# Patient Record
(demographics unavailable — no encounter records)

---

## 2025-03-07 NOTE — HEALTH RISK ASSESSMENT
[Little interest or pleasure doing things] : 1) Little interest or pleasure doing things [Feeling down, depressed, or hopeless] : 2) Feeling down, depressed, or hopeless [0] : 2) Feeling down, depressed, or hopeless: Not at all (0) [PHQ-9 Negative - No further assessment needed] : PHQ-9 Negative - No further assessment needed [Never] : Never

## 2025-03-17 NOTE — ASSESSMENT
[FreeTextEntry1] : 37M with PMH of obesity, HTN, HLD, pre-DM, GERD, presenting for GERD related symptoms with the complaint of occasional dysphagia.   Plan:  - will start PPI trial with omeprazole 40 mg PO daily x 2 months, followed by 1 month off - f/u H. pylori breath test and CBC from today  - if no improvement, will plan for EGD given above symptoms  - will obtain records from 2010 EGD with [facility]  - referral provided for GI dietician  - discussed lifestyle modifications for GERD, many of which patient has incorporated already  - RTC in 3 months once off of PPI to reassess symptoms and need for EGD

## 2025-03-17 NOTE — HISTORY OF PRESENT ILLNESS
[FreeTextEntry1] : 37M with PMH of obesity, HTN, HLD, pre-DM, GERD, presenting for GERD related symptoms.   Patient describes that symptoms began in [id number], so saw a GI at [place] in [facility].  Was recommended to undergo EGD without PPI trail, which showed evidence of acid reflux per patient.  Was put on Nexium (cannot recall dose) for 2 weeks and symptoms initially improved.  Over the years, he says his symptoms have now returned. Describes that his stomach feels heavy, bloated with burning that refluxes back.  Describes that he sleeps with head of bed elevated but still has symptoms at night.  Was prescribed famotidine by PCP (Dr. Zavala) but says this has not improved his symptoms much.  Denies n/v/d/c, fevers, abdominal pain, unintentional weight loss, odynophagia, melena, BRBPR, and hematemesis.  Does complain of some difficulty with swallowing certain foods (melon or apples) and feels that he has to swallow twice. No trouble swallowing liquids.  Notices that hot sauces and more acidic foods provoke his symptoms, so does try to avoid them.  Has not taken famotidine or a PPI in the past 2 weeks.  In , H. pylori IgA and IgG were negative so was PPI 40 mg PO daily x 6 weeks.   Most recent lab work from 2025:  - CMP within normal limits with AST/ALT 18/23, alk phos 78, tbili 0.2  - HbA1c 6.7%  - last CBC from  showed WBC 7.85, Hb 13.8, plt 253  PMH: see above PSH: denies  Meds: lisinopril and amlodipine  Allergies: penicillin and doxycycline   FH: aunt  from gastric cancer, mother has H. pylori, denies FH colon cancer or pancreatic cancer; denies FH of UC, CD, and celiac disease  SH: denies drug use and smoking, in terms of ETOH maybe consumes 1 drink per month at most, worked for [employer], originally from [facility]   EGD:  - done in  at [facility] in [facility]  - per patient, 'showed he had acid reflux'   Colonoscopy:  - none prior

## 2025-04-16 NOTE — HEALTH RISK ASSESSMENT
[Little interest or pleasure doing things] : 1) Little interest or pleasure doing things [Feeling down, depressed, or hopeless] : 2) Feeling down, depressed, or hopeless [0] : 2) Feeling down, depressed, or hopeless: Not at all (0) [PHQ-9 Negative - No further assessment needed] : PHQ-9 Negative - No further assessment needed [Never] : Never [[id number]] : 0

## 2025-04-16 NOTE — HISTORY OF PRESENT ILLNESS
[FreeTextEntry1] : pt here to fill forms [de-identified] : 37M with PMH of obesity, htn, GERD w/. prior H. pylori s/p treatment and following with GI presents today for BP cuff form filled out. He has no medical complaints, needs no refills on his medications. The form is  [facility] BP Monitor request form. BP checked with a Large Adult 12 (32-43cm) cuff.

## 2025-04-16 NOTE — HEALTH RISK ASSESSMENT
[Little interest or pleasure doing things] : 1) Little interest or pleasure doing things [Feeling down, depressed, or hopeless] : 2) Feeling down, depressed, or hopeless [0] : 2) Feeling down, depressed, or hopeless: Not at all (0) [PHQ-9 Negative - No further assessment needed] : PHQ-9 Negative - No further assessment needed [Never] : Never [[id number]] : 0

## 2025-04-16 NOTE — HISTORY OF PRESENT ILLNESS
[FreeTextEntry1] : pt here to fill forms [de-identified] : 37M with PMH of obesity, htn, GERD w/. prior H. pylori s/p treatment and following with GI presents today for BP cuff form filled out. He has no medical complaints, needs no refills on his medications. The form is  [facility] BP Monitor request form. BP checked with a Large Adult 12 (32-43cm) cuff.

## 2025-05-05 NOTE — ASSESSMENT
[FreeTextEntry1] : . PLAN #L elbow pain Likely lateral epicondylitis/extensor tendinopathy, unclear trigger. Low c/f fx given no recent trauma to area and no focal tenderness; however will r/o w/ XR. - XR LUE today for fx r/o - Refer to PT today.  - Instructed to take OTC NSAIDs PRN for pain - If pain persists at next visit, will consider sports medicine referral.   #HTN  Home BP cuff ordered at 03/2025 visit, brought to office at today's visit. Today - LUE BP per home cuff: 134/82. Office cuff: 137/72.  - Cont lisinopril 20 mg qd (no refills needed today) - Cont amlodipine 10 mg qd (no refills needed today) - Educated patient on appropriate home BP cuff use.  - Instructed pt to maintain home BP log and RTC via tele visit in 2wk for BP check  #Excema Rash appearance: superficial excoriations on dorsum of hands. Previously evaluated by derm, w/ rash relieved by augmented betamethasone cream for 2-3y, now w/ returning sx. Pt would like to establish care w/ new dermatologist. - Referred to derm today  #HLD 03/2025: PREVENT ASCVD 10y risk:  1.2% (low) - No interventions at this time    RTC in 2wks by tele visit for BP check, or earlier as needed

## 2025-05-05 NOTE — END OF VISIT
[] : Resident [FreeTextEntry3] :  37 year old M presenting for atraumatic L elbow pain x 1 month. Also notes recent flare of rash on his hands, reports hx of eczema previously assessed by derm. Well appearing on exam. Small purpuric macules on L wrist, scattered excoriations on dorsum of R hand. Discussed emollients, topical steroid for eczema (previously used betamethasone ointment per Dr. Dave's prior note), pt prefers to discuss with derm, referral provided. LIkely L lateral epicondyiltis, referred to PT, obtain x-ray to rule out OA or fracture.

## 2025-05-05 NOTE — HISTORY OF PRESENT ILLNESS
[FreeTextEntry1] : L elbow pain [de-identified] : 37M with PMH of obesity, htn, excema, GERD w/. prior H. pylori s/p treatment and following with GI presents today for eval of L elbow pain. Last seen in 04/2025 for [employer] BP Monitor form. Brought BP cuff in office today for calibration.   Today, pt c/o L elbow pain that began 1-2mo/a, mildly worsening since onset. States he woke up with the pain one day, no known trigger. Exacerbated by lifting objects, even 2-3lbs.   Also c/o small rash on R hand dorsum, needs derm referral as current derm is on maternity leave. Previously used prescription cream w/ success, though pt unclear of name. Rash well controlled for 2-3 since cream use, now flaring again.

## 2025-05-05 NOTE — PHYSICAL EXAM
[Normal] : normal rate, regular rhythm, normal S1 and S2 and no murmur heard [de-identified] : L elbow lateral malleolus pain. Pain w/ resisted wrist extension. No focal bony tenderness, ballottable effusions, erythema, significant joing swelling.  [de-identified] : Dry excoriations x3 on R hand dorsum. Horseshoe-shaped erythematous rash on lateral L wrist (reportedly chronic)